# Patient Record
Sex: FEMALE | Race: WHITE | NOT HISPANIC OR LATINO
[De-identification: names, ages, dates, MRNs, and addresses within clinical notes are randomized per-mention and may not be internally consistent; named-entity substitution may affect disease eponyms.]

---

## 2024-11-07 ENCOUNTER — DASHBOARD ENCOUNTERS (OUTPATIENT)
Age: 84
End: 2024-11-07

## 2024-11-07 ENCOUNTER — OFFICE VISIT (OUTPATIENT)
Dept: URBAN - METROPOLITAN AREA CLINIC 113 | Facility: CLINIC | Age: 84
End: 2024-11-07
Payer: MEDICARE

## 2024-11-07 VITALS
HEART RATE: 77 BPM | RESPIRATION RATE: 14 BRPM | BODY MASS INDEX: 19.6 KG/M2 | TEMPERATURE: 97.1 F | WEIGHT: 103.8 LBS | SYSTOLIC BLOOD PRESSURE: 150 MMHG | HEIGHT: 61 IN | DIASTOLIC BLOOD PRESSURE: 68 MMHG

## 2024-11-07 DIAGNOSIS — R97.0 ELEVATED CEA: ICD-10-CM

## 2024-11-07 DIAGNOSIS — E53.8 B12 DEFICIENCY: ICD-10-CM

## 2024-11-07 DIAGNOSIS — K63.8219 SMALL INTESTINAL BACTERIAL OVERGROWTH (SIBO): ICD-10-CM

## 2024-11-07 DIAGNOSIS — R14.0 BLOATING: ICD-10-CM

## 2024-11-07 DIAGNOSIS — K59.09 CHRONIC CONSTIPATION: ICD-10-CM

## 2024-11-07 DIAGNOSIS — K57.10 DUODENAL DIVERTICULUM: ICD-10-CM

## 2024-11-07 DIAGNOSIS — R63.4 WEIGHT LOSS: ICD-10-CM

## 2024-11-07 DIAGNOSIS — D49.0 IPMN (INTRADUCTAL PAPILLARY MUCINOUS NEOPLASM): ICD-10-CM

## 2024-11-07 PROBLEM — 445201008: Status: ACTIVE | Noted: 2024-11-07

## 2024-11-07 PROBLEM — 762275008: Status: ACTIVE | Noted: 2024-11-07

## 2024-11-07 PROCEDURE — 99204 OFFICE O/P NEW MOD 45 MIN: CPT | Performed by: INTERNAL MEDICINE

## 2024-11-07 RX ORDER — ZINC GLUCONATE 50 MG
1 TABLET TABLET ORAL ONCE A DAY
Status: ACTIVE | COMMUNITY

## 2024-11-07 RX ORDER — METRONIDAZOLE 500 MG/1
1 TABLET TABLET ORAL TWICE A DAY
Qty: 20 TABLET | Refills: 0 | OUTPATIENT
Start: 2024-11-07 | End: 2024-11-17

## 2024-11-07 RX ORDER — MAGNESIUM OXIDE/MAG AA CHELATE 300 MG
1 CAPSULE WITH A MEAL CAPSULE ORAL ONCE A DAY
Status: ACTIVE | COMMUNITY

## 2024-11-07 RX ORDER — BLACK COHOSH ROOT EXTRACT 80 MG
AS DIRECTED CAPSULE ORAL
Status: ACTIVE | COMMUNITY

## 2024-11-07 NOTE — HPI-TODAY'S VISIT:
The patient is a very pleasant 84-year-old female referred for gastrointestinal issues. Extensive review of referring records was performed. Laboratory testing dated October 7 of this year reveals a normal CBC except for slightly elevated eosinophils at 5.2% and slightly elevated MCV at 94.3.  CMP was unremarkable other than glucose of 112.  Hemoglobin A1c was normal at 5.5.  TSH was normal and vitamin B12 was within the normal range but low into the normal range at 239. Laboratory testing dated July 12 of this year revealed a normal CMP, low end of normal B12 at 244, elevated CEA at 4.7, normal LDH at 166.  CBC was unremarkable at that time.  Homocystine level was normal at 16. CT scan with enterography performed June 17 of this year revealed small bowel anastomosis without evidence for obstruction.  A duodenal diverticulum.  Cystic lesion in the pancreas unchanged consistent with an IPMN.  This measured 6 mm. The patient states she comes in because of issues of chronic constipation and bloating.  This has become much worse over the past few months.  Interestingly her  passed away a few months ago.  She has had weight loss with this.  She is seeing a nutritionist who is trying to increase the protein in her diet and decrease the fruits and some of the vegetables.  She did have a small bowel resection for an unknown issue many years ago.  She does have chronic hard stools that are very difficult to pass and sometimes she has to use a manual attempts to get the stool out.  She is not currently using laxatives on a regular basis.  She states her last colonoscopy was 9 years ago by a local gastroenterologist and that it was unremarkable.

## 2024-11-18 ENCOUNTER — TELEPHONE ENCOUNTER (OUTPATIENT)
Dept: URBAN - METROPOLITAN AREA CLINIC 113 | Facility: CLINIC | Age: 84
End: 2024-11-18

## 2024-12-11 ENCOUNTER — TELEPHONE ENCOUNTER (OUTPATIENT)
Dept: URBAN - METROPOLITAN AREA CLINIC 113 | Facility: CLINIC | Age: 84
End: 2024-12-11

## 2024-12-11 RX ORDER — METRONIDAZOLE 500 MG/1
1 TABLET TABLET ORAL TWICE A DAY
Qty: 20 TABLET | Refills: 0
Start: 2024-11-07 | End: 2024-12-21

## 2024-12-27 ENCOUNTER — OFFICE VISIT (OUTPATIENT)
Dept: URBAN - METROPOLITAN AREA CLINIC 113 | Facility: CLINIC | Age: 84
End: 2024-12-27

## 2025-01-07 ENCOUNTER — LAB OUTSIDE AN ENCOUNTER (OUTPATIENT)
Dept: URBAN - METROPOLITAN AREA CLINIC 113 | Facility: CLINIC | Age: 85
End: 2025-01-07

## 2025-01-07 ENCOUNTER — OFFICE VISIT (OUTPATIENT)
Dept: URBAN - METROPOLITAN AREA CLINIC 113 | Facility: CLINIC | Age: 85
End: 2025-01-07
Payer: MEDICARE

## 2025-01-07 VITALS
RESPIRATION RATE: 18 BRPM | HEIGHT: 61 IN | WEIGHT: 110 LBS | DIASTOLIC BLOOD PRESSURE: 66 MMHG | TEMPERATURE: 97.8 F | BODY MASS INDEX: 20.77 KG/M2 | HEART RATE: 84 BPM | SYSTOLIC BLOOD PRESSURE: 150 MMHG

## 2025-01-07 DIAGNOSIS — R63.4 WEIGHT LOSS: ICD-10-CM

## 2025-01-07 DIAGNOSIS — K63.8219 SMALL INTESTINAL BACTERIAL OVERGROWTH (SIBO): ICD-10-CM

## 2025-01-07 DIAGNOSIS — R14.0 BLOATING: ICD-10-CM

## 2025-01-07 DIAGNOSIS — E53.8 B12 DEFICIENCY: ICD-10-CM

## 2025-01-07 DIAGNOSIS — K57.10 DUODENAL DIVERTICULUM: ICD-10-CM

## 2025-01-07 DIAGNOSIS — K59.09 CHRONIC CONSTIPATION: ICD-10-CM

## 2025-01-07 DIAGNOSIS — D49.0 IPMN (INTRADUCTAL PAPILLARY MUCINOUS NEOPLASM): ICD-10-CM

## 2025-01-07 DIAGNOSIS — R97.0 ELEVATED CEA: ICD-10-CM

## 2025-01-07 PROCEDURE — 99214 OFFICE O/P EST MOD 30 MIN: CPT | Performed by: INTERNAL MEDICINE

## 2025-01-07 RX ORDER — MAGNESIUM OXIDE/MAG AA CHELATE 300 MG
1 CAPSULE WITH A MEAL CAPSULE ORAL ONCE A DAY
Status: ON HOLD | COMMUNITY

## 2025-01-07 RX ORDER — ZINC GLUCONATE 50 MG
1 TABLET TABLET ORAL ONCE A DAY
Status: ON HOLD | COMMUNITY

## 2025-01-07 RX ORDER — WHEAT DEXTRIN 3 G/3.5 G
AS DIRECTED POWDER (GRAM) ORAL
Status: ACTIVE | COMMUNITY

## 2025-01-07 RX ORDER — GLUTAMINE
AS DIRECTED POWDER (GRAM) MISCELLANEOUS
Status: ON HOLD | COMMUNITY

## 2025-01-07 RX ORDER — POLYETHYLENE GLYCOL 3350 17 G/DOSE
1 SCOOP MIXED WITH 8 OUNCES OF FLUID POWDER (GRAM) ORAL ONCE A DAY
Status: ACTIVE | COMMUNITY

## 2025-01-07 RX ORDER — BLACK COHOSH ROOT EXTRACT 80 MG
AS DIRECTED CAPSULE ORAL
Status: ON HOLD | COMMUNITY

## 2025-01-07 NOTE — HPI-TODAY'S VISIT:
The patient is a very pleasant 84-year-old female referred for gastrointestinal issues. Extensive review of referring records was performed. Laboratory testing dated October 7 of this year reveals a normal CBC except for slightly elevated eosinophils at 5.2% and slightly elevated MCV at 94.3.  CMP was unremarkable other than glucose of 112.  Hemoglobin A1c was normal at 5.5.  TSH was normal and vitamin B12 was within the normal range but low into the normal range at 239. Laboratory testing dated July 12 of this year revealed a normal CMP, low end of normal B12 at 244, elevated CEA at 4.7, normal LDH at 166.  CBC was unremarkable at that time.  Homocystine level was normal at 16. CT scan with enterography performed June 17 of this year revealed small bowel anastomosis without evidence for obstruction.  A duodenal diverticulum.  Cystic lesion in the pancreas unchanged consistent with an IPMN.  This measured 6 mm. The patient states she comes in because of issues of chronic constipation and bloating.  This has become much worse over the past few months.  Interestingly her  passed away a few months ago.  She has had weight loss with this.  She is seeing a nutritionist who is trying to increase the protein in her diet and decrease the fruits and some of the vegetables.  She did have a small bowel resection for an unknown issue many years ago.  She does have chronic hard stools that are very difficult to pass and sometimes she has to use a manual attempts to get the stool out.  She is not currently using laxatives on a regular basis.  She states her last colonoscopy was 9 years ago by a local gastroenterologist and that it was unremarkable. Interval history, 1/7/2025. The patient states that metronidazole has made her much better.  She did do a second run.  She is also continuing the fiber and MiraLAX.  She has good bowel movements.  Sometimes her bowel movements are actually a little too runny.  She has gained some weight as she has liberalized her diet.  She does have some right upper quadrant discomfort that is been coming and going.

## 2025-03-13 ENCOUNTER — TELEPHONE ENCOUNTER (OUTPATIENT)
Dept: URBAN - METROPOLITAN AREA CLINIC 113 | Facility: CLINIC | Age: 85
End: 2025-03-13

## 2025-03-13 RX ORDER — CHOLESTYRAMINE 4 G/9G
1 SCOOP MIXED WITH WATER OR NON-CARBONATED DRINK POWDER, FOR SUSPENSION ORAL TWICE A DAY
Qty: 60 | Refills: 3 | OUTPATIENT
Start: 2025-03-17

## 2025-04-07 ENCOUNTER — OFFICE VISIT (OUTPATIENT)
Dept: URBAN - METROPOLITAN AREA CLINIC 113 | Facility: CLINIC | Age: 85
End: 2025-04-07
Payer: MEDICARE

## 2025-04-07 ENCOUNTER — LAB OUTSIDE AN ENCOUNTER (OUTPATIENT)
Dept: URBAN - METROPOLITAN AREA CLINIC 113 | Facility: CLINIC | Age: 85
End: 2025-04-07

## 2025-04-07 DIAGNOSIS — R63.4 WEIGHT LOSS: ICD-10-CM

## 2025-04-07 DIAGNOSIS — R97.0 ELEVATED CEA: ICD-10-CM

## 2025-04-07 DIAGNOSIS — K63.8219 SMALL INTESTINAL BACTERIAL OVERGROWTH (SIBO): ICD-10-CM

## 2025-04-07 DIAGNOSIS — K40.90 LEFT INGUINAL HERNIA: ICD-10-CM

## 2025-04-07 DIAGNOSIS — K57.10 DUODENAL DIVERTICULUM: ICD-10-CM

## 2025-04-07 DIAGNOSIS — D49.0 IPMN (INTRADUCTAL PAPILLARY MUCINOUS NEOPLASM): ICD-10-CM

## 2025-04-07 DIAGNOSIS — R14.0 BLOATING: ICD-10-CM

## 2025-04-07 DIAGNOSIS — E53.8 B12 DEFICIENCY: ICD-10-CM

## 2025-04-07 DIAGNOSIS — K59.09 CHRONIC CONSTIPATION: ICD-10-CM

## 2025-04-07 PROCEDURE — 99214 OFFICE O/P EST MOD 30 MIN: CPT | Performed by: INTERNAL MEDICINE

## 2025-04-07 RX ORDER — POLYETHYLENE GLYCOL 3350 17 G/DOSE
1 SCOOP MIXED WITH 8 OUNCES OF FLUID POWDER (GRAM) ORAL ONCE A DAY
Status: ACTIVE | COMMUNITY

## 2025-04-07 RX ORDER — BLACK COHOSH ROOT EXTRACT 80 MG
AS DIRECTED CAPSULE ORAL
Status: ON HOLD | COMMUNITY

## 2025-04-07 RX ORDER — WHEAT DEXTRIN 3 G/3.5 G
AS DIRECTED POWDER (GRAM) ORAL
Status: ACTIVE | COMMUNITY

## 2025-04-07 RX ORDER — ZINC GLUCONATE 50 MG
1 TABLET TABLET ORAL ONCE A DAY
Status: ON HOLD | COMMUNITY

## 2025-04-07 RX ORDER — GLUTAMINE
AS DIRECTED POWDER (GRAM) MISCELLANEOUS
Status: ON HOLD | COMMUNITY

## 2025-04-07 RX ORDER — MAGNESIUM OXIDE/MAG AA CHELATE 300 MG
1 CAPSULE WITH A MEAL CAPSULE ORAL ONCE A DAY
Status: ON HOLD | COMMUNITY

## 2025-04-07 RX ORDER — CHOLESTYRAMINE 4 G/9G
1 SCOOP MIXED WITH WATER OR NON-CARBONATED DRINK POWDER, FOR SUSPENSION ORAL TWICE A DAY
Qty: 60 | Refills: 3 | Status: ON HOLD | COMMUNITY
Start: 2025-03-17

## 2025-04-07 NOTE — HPI-TODAY'S VISIT:
The patient is a very pleasant 84-year-old female referred for gastrointestinal issues. Extensive review of referring records was performed. Laboratory testing dated October 7 of this year reveals a normal CBC except for slightly elevated eosinophils at 5.2% and slightly elevated MCV at 94.3.  CMP was unremarkable other than glucose of 112.  Hemoglobin A1c was normal at 5.5.  TSH was normal and vitamin B12 was within the normal range but low into the normal range at 239. Laboratory testing dated July 12 of this year revealed a normal CMP, low end of normal B12 at 244, elevated CEA at 4.7, normal LDH at 166.  CBC was unremarkable at that time.  Homocystine level was normal at 16. CT scan with enterography performed June 17 of this year revealed small bowel anastomosis without evidence for obstruction.  A duodenal diverticulum.  Cystic lesion in the pancreas unchanged consistent with an IPMN.  This measured 6 mm. The patient states she comes in because of issues of chronic constipation and bloating.  This has become much worse over the past few months.  Interestingly her  passed away a few months ago.  She has had weight loss with this.  She is seeing a nutritionist who is trying to increase the protein in her diet and decrease the fruits and some of the vegetables.  She did have a small bowel resection for an unknown issue many years ago.  She does have chronic hard stools that are very difficult to pass and sometimes she has to use a manual attempts to get the stool out.  She is not currently using laxatives on a regular basis.  She states her last colonoscopy was 9 years ago by a local gastroenterologist and that it was unremarkable. Interval history, 1/7/2025. The patient states that metronidazole has made her much better.  She did do a second run.  She is also continuing the fiber and MiraLAX.  She has good bowel movements.  Sometimes her bowel movements are actually a little too runny.  She has gained some weight as she has liberalized her diet.  She does have some right upper quadrant discomfort that is been coming and going. Interval history, 4/7/2025. CT scan the abdomen and pelvis performed January 20 of this year for follow-up of IPMN revealed a 6 mm stable pancreatic IPMN.  Evidence for previous small bowel resection, duodenal diverticulum and evidence for constipation.  Also small left inguinal hernia. The patient states she continues to have problems.  Her main problem is she is now having 4-5 bowel movements with a sense of incomplete evacuation in the last 2 bowel movements being often loose.

## 2025-05-12 ENCOUNTER — CLAIMS CREATED FROM THE CLAIM WINDOW (OUTPATIENT)
Dept: URBAN - METROPOLITAN AREA SURGERY CENTER 25 | Facility: SURGERY CENTER | Age: 85
End: 2025-05-12
Payer: MEDICARE

## 2025-05-12 DIAGNOSIS — K63.89 OTHER SPECIFIED DISEASES OF INTESTINE: ICD-10-CM

## 2025-05-12 DIAGNOSIS — R19.4 ALTERATION IN BOWEL ELIMINATION: ICD-10-CM

## 2025-05-12 PROCEDURE — 45378 DIAGNOSTIC COLONOSCOPY: CPT | Performed by: INTERNAL MEDICINE

## 2025-05-12 PROCEDURE — 00811 ANES LWR INTST NDSC NOS: CPT | Performed by: NURSE ANESTHETIST, CERTIFIED REGISTERED

## 2025-05-12 PROCEDURE — 00811 ANES LWR INTST NDSC NOS: CPT | Performed by: ANESTHESIOLOGY

## 2025-05-12 RX ORDER — MAGNESIUM OXIDE/MAG AA CHELATE 300 MG
1 CAPSULE WITH A MEAL CAPSULE ORAL ONCE A DAY
Status: ON HOLD | COMMUNITY

## 2025-05-12 RX ORDER — BLACK COHOSH ROOT EXTRACT 80 MG
AS DIRECTED CAPSULE ORAL
Status: ON HOLD | COMMUNITY

## 2025-05-12 RX ORDER — CHOLESTYRAMINE 4 G/9G
1 SCOOP MIXED WITH WATER OR NON-CARBONATED DRINK POWDER, FOR SUSPENSION ORAL TWICE A DAY
Qty: 60 | Refills: 3 | Status: ON HOLD | COMMUNITY
Start: 2025-03-17

## 2025-05-12 RX ORDER — GLUTAMINE
AS DIRECTED POWDER (GRAM) MISCELLANEOUS
Status: ON HOLD | COMMUNITY

## 2025-05-12 RX ORDER — WHEAT DEXTRIN 3 G/3.5 G
AS DIRECTED POWDER (GRAM) ORAL
Status: ACTIVE | COMMUNITY

## 2025-05-12 RX ORDER — POLYETHYLENE GLYCOL 3350 17 G/DOSE
1 SCOOP MIXED WITH 8 OUNCES OF FLUID POWDER (GRAM) ORAL ONCE A DAY
Status: ACTIVE | COMMUNITY

## 2025-05-12 RX ORDER — ZINC GLUCONATE 50 MG
1 TABLET TABLET ORAL ONCE A DAY
Status: ON HOLD | COMMUNITY

## 2025-05-13 ENCOUNTER — TELEPHONE ENCOUNTER (OUTPATIENT)
Dept: URBAN - METROPOLITAN AREA CLINIC 113 | Facility: CLINIC | Age: 85
End: 2025-05-13

## 2025-06-04 ENCOUNTER — OFFICE VISIT (OUTPATIENT)
Dept: URBAN - METROPOLITAN AREA CLINIC 113 | Facility: CLINIC | Age: 85
End: 2025-06-04
Payer: MEDICARE

## 2025-06-04 DIAGNOSIS — K63.8219 SMALL INTESTINAL BACTERIAL OVERGROWTH (SIBO): ICD-10-CM

## 2025-06-04 DIAGNOSIS — K59.09 CHRONIC CONSTIPATION: ICD-10-CM

## 2025-06-04 DIAGNOSIS — D49.0 IPMN (INTRADUCTAL PAPILLARY MUCINOUS NEOPLASM): ICD-10-CM

## 2025-06-04 DIAGNOSIS — R63.4 WEIGHT LOSS: ICD-10-CM

## 2025-06-04 DIAGNOSIS — E53.8 B12 DEFICIENCY: ICD-10-CM

## 2025-06-04 DIAGNOSIS — K57.10 DUODENAL DIVERTICULUM: ICD-10-CM

## 2025-06-04 DIAGNOSIS — R97.0 ELEVATED CEA: ICD-10-CM

## 2025-06-04 DIAGNOSIS — R14.0 BLOATING: ICD-10-CM

## 2025-06-04 DIAGNOSIS — K40.90 LEFT INGUINAL HERNIA: ICD-10-CM

## 2025-06-04 PROCEDURE — 99213 OFFICE O/P EST LOW 20 MIN: CPT | Performed by: INTERNAL MEDICINE

## 2025-06-04 RX ORDER — MAGNESIUM OXIDE/MAG AA CHELATE 300 MG
1 CAPSULE WITH A MEAL CAPSULE ORAL ONCE A DAY
Status: ON HOLD | COMMUNITY

## 2025-06-04 RX ORDER — GLUTAMINE
AS DIRECTED POWDER (GRAM) MISCELLANEOUS
Status: ON HOLD | COMMUNITY

## 2025-06-04 RX ORDER — ZINC GLUCONATE 50 MG
1 TABLET TABLET ORAL ONCE A DAY
Status: ON HOLD | COMMUNITY

## 2025-06-04 RX ORDER — POLYETHYLENE GLYCOL 3350 17 G/DOSE
1 SCOOP MIXED WITH 8 OUNCES OF FLUID POWDER (GRAM) ORAL ONCE A DAY
Status: ACTIVE | COMMUNITY

## 2025-06-04 RX ORDER — WHEAT DEXTRIN 3 G/3.5 G
AS DIRECTED POWDER (GRAM) ORAL
Status: ACTIVE | COMMUNITY

## 2025-06-04 RX ORDER — BLACK COHOSH ROOT EXTRACT 80 MG
AS DIRECTED CAPSULE ORAL
Status: ON HOLD | COMMUNITY

## 2025-06-04 RX ORDER — CHOLESTYRAMINE 4 G/9G
1 SCOOP MIXED WITH WATER OR NON-CARBONATED DRINK POWDER, FOR SUSPENSION ORAL TWICE A DAY
Qty: 60 | Refills: 3 | Status: ON HOLD | COMMUNITY
Start: 2025-03-17

## 2025-06-04 NOTE — HPI-TODAY'S VISIT:
The patient is a very pleasant 84-year-old female referred for gastrointestinal issues. Extensive review of referring records was performed. Laboratory testing dated October 7 of this year reveals a normal CBC except for slightly elevated eosinophils at 5.2% and slightly elevated MCV at 94.3.  CMP was unremarkable other than glucose of 112.  Hemoglobin A1c was normal at 5.5.  TSH was normal and vitamin B12 was within the normal range but low into the normal range at 239. Laboratory testing dated July 12 of this year revealed a normal CMP, low end of normal B12 at 244, elevated CEA at 4.7, normal LDH at 166.  CBC was unremarkable at that time.  Homocystine level was normal at 16. CT scan with enterography performed June 17 of this year revealed small bowel anastomosis without evidence for obstruction.  A duodenal diverticulum.  Cystic lesion in the pancreas unchanged consistent with an IPMN.  This measured 6 mm. The patient states she comes in because of issues of chronic constipation and bloating.  This has become much worse over the past few months.  Interestingly her  passed away a few months ago.  She has had weight loss with this.  She is seeing a nutritionist who is trying to increase the protein in her diet and decrease the fruits and some of the vegetables.  She did have a small bowel resection for an unknown issue many years ago.  She does have chronic hard stools that are very difficult to pass and sometimes she has to use a manual attempts to get the stool out.  She is not currently using laxatives on a regular basis.  She states her last colonoscopy was 9 years ago by a local gastroenterologist and that it was unremarkable. Interval history, 1/7/2025. The patient states that metronidazole has made her much better.  She did do a second run.  She is also continuing the fiber and MiraLAX.  She has good bowel movements.  Sometimes her bowel movements are actually a little too runny.  She has gained some weight as she has liberalized her diet.  She does have some right upper quadrant discomfort that is been coming and going. Interval history, 4/7/2025. CT scan the abdomen and pelvis performed January 20 of this year for follow-up of IPMN revealed a 6 mm stable pancreatic IPMN.  Evidence for previous small bowel resection, duodenal diverticulum and evidence for constipation.  Also small left inguinal hernia. The patient states she continues to have problems.  Her main problem is she is now having 4-5 bowel movements with a sense of incomplete evacuation in the last 2 bowel movements being often loose. Interval history, 6/4/2025. Colonoscopy performed May 12 of last month for change in bowel habits and elevated CEA revealed diverticulosis.  Due to patient's age patient was not recommended further screening procedures. The patient states that she is doing very well with MiraLAX and fiber.  We discussed her colonoscopy findings.